# Patient Record
Sex: FEMALE | Race: WHITE | URBAN - METROPOLITAN AREA
[De-identification: names, ages, dates, MRNs, and addresses within clinical notes are randomized per-mention and may not be internally consistent; named-entity substitution may affect disease eponyms.]

---

## 2022-11-29 ENCOUNTER — OFFICE VISIT (OUTPATIENT)
Dept: URBAN - METROPOLITAN AREA CLINIC 18 | Facility: CLINIC | Age: 57
End: 2022-11-29
Payer: COMMERCIAL

## 2022-11-29 DIAGNOSIS — H52.4 PRESBYOPIA: Primary | ICD-10-CM

## 2022-11-29 PROCEDURE — 92002 INTRM OPH EXAM NEW PATIENT: CPT | Performed by: OPTOMETRIST

## 2022-11-29 ASSESSMENT — VISUAL ACUITY
OS: 20/20
OD: 20/20

## 2022-11-29 ASSESSMENT — INTRAOCULAR PRESSURE
OD: 13
OS: 18

## 2022-11-29 NOTE — IMPRESSION/PLAN
Impression: Presbyopia: H52.4. Plan: Finalized [employer]. Patient education on appropriate options of eye glasses. Return to clinic in one year for complete eye exam and refraction.

## 2023-04-04 ENCOUNTER — OFFICE VISIT (OUTPATIENT)
Dept: URBAN - METROPOLITAN AREA CLINIC 18 | Facility: CLINIC | Age: 58
End: 2023-04-04
Payer: COMMERCIAL

## 2023-04-04 DIAGNOSIS — M06.9 RHEUMATOID ARTHRITIS, UNSPECIFIED: Primary | ICD-10-CM

## 2023-04-04 DIAGNOSIS — Z79.899 OTHER LONG TERM (CURRENT) DRUG THERAPY: ICD-10-CM

## 2023-04-04 PROCEDURE — 92083 EXTENDED VISUAL FIELD XM: CPT | Performed by: OPTOMETRIST

## 2023-04-04 PROCEDURE — 92134 CPTRZ OPH DX IMG PST SGM RTA: CPT | Performed by: OPTOMETRIST

## 2023-04-04 PROCEDURE — 99213 OFFICE O/P EST LOW 20 MIN: CPT | Performed by: OPTOMETRIST

## 2023-04-04 ASSESSMENT — INTRAOCULAR PRESSURE
OS: 13
OD: 13

## 2023-04-04 NOTE — IMPRESSION/PLAN
Impression: Other long term (current) drug therapy: Z79.899. 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

## 2023-04-04 NOTE — IMPRESSION/PLAN
Impression: Rheumatoid arthritis, unspecified: M06.9. Plan: No signs of toxic maculopathy noted OU with use of Plaquenil.

## 2023-06-08 ENCOUNTER — OFFICE VISIT (OUTPATIENT)
Dept: URBAN - METROPOLITAN AREA CLINIC 18 | Facility: CLINIC | Age: 58
End: 2023-06-08
Payer: COMMERCIAL

## 2023-06-08 DIAGNOSIS — H52.4 PRESBYOPIA: Primary | ICD-10-CM

## 2023-06-08 PROCEDURE — 92012 INTRM OPH EXAM EST PATIENT: CPT | Performed by: OPTOMETRIST

## 2023-06-08 ASSESSMENT — VISUAL ACUITY
OD: 20/20
OS: 20/20

## 2023-06-08 ASSESSMENT — INTRAOCULAR PRESSURE
OD: 17
OS: 16